# Patient Record
Sex: FEMALE
[De-identification: names, ages, dates, MRNs, and addresses within clinical notes are randomized per-mention and may not be internally consistent; named-entity substitution may affect disease eponyms.]

---

## 2018-08-31 ENCOUNTER — HOSPITAL ENCOUNTER (OUTPATIENT)
Dept: HOSPITAL 92 - BICULT | Age: 48
Discharge: HOME | End: 2018-08-31
Attending: FAMILY MEDICINE
Payer: COMMERCIAL

## 2018-08-31 DIAGNOSIS — K80.20: Primary | ICD-10-CM

## 2018-08-31 PROCEDURE — 76705 ECHO EXAM OF ABDOMEN: CPT

## 2018-10-02 ENCOUNTER — HOSPITAL ENCOUNTER (OUTPATIENT)
Dept: HOSPITAL 92 - SCSCT | Age: 48
Discharge: HOME | End: 2018-10-02
Attending: INTERNAL MEDICINE
Payer: COMMERCIAL

## 2018-10-02 DIAGNOSIS — R07.9: Primary | ICD-10-CM

## 2018-10-02 DIAGNOSIS — R07.81: ICD-10-CM

## 2018-10-02 DIAGNOSIS — K59.00: ICD-10-CM

## 2018-10-02 PROCEDURE — 74160 CT ABDOMEN W/CONTRAST: CPT

## 2018-10-02 NOTE — CT
CT ABDOMEN AND PELVIS WITH IV AND ORAL CONTRAST:

 

HISTORY: 

Postprandial abdominal pain.

 

FINDINGS: 

Lung bases are clear.  The liver, spleen, kidneys, adrenal glands, and pancreas have a normal CT appe
arance.  Nonenlarged, nonspecific lymph nodes are scattered about the mesentery and retroperitoneum. 
 Urinary bladder is incompletely distended.  Appendix not well delineated.  No inflammation.  A large
 amount of stool throughout the colon.

 

IMPRESSION: 

Constipation.  No acute abnormalities are demonstrated.

 

POS: [facility]

## 2020-09-14 ENCOUNTER — HOSPITAL ENCOUNTER (OUTPATIENT)
Dept: HOSPITAL 92 - CTENTCT | Age: 50
Discharge: HOME | End: 2020-09-14
Attending: OTOLARYNGOLOGY
Payer: COMMERCIAL

## 2020-09-14 DIAGNOSIS — J32.9: Primary | ICD-10-CM

## 2020-09-14 PROCEDURE — 70486 CT MAXILLOFACIAL W/O DYE: CPT

## 2025-02-21 ENCOUNTER — HOSPITAL ENCOUNTER (OUTPATIENT)
Dept: HOSPITAL 18 - NAV CT | Age: 55
Discharge: HOME | End: 2025-02-21
Payer: COMMERCIAL

## 2025-02-21 DIAGNOSIS — Z82.3: ICD-10-CM

## 2025-02-21 DIAGNOSIS — G44.89: Primary | ICD-10-CM

## 2025-02-21 DIAGNOSIS — J32.8: ICD-10-CM

## 2025-02-21 PROCEDURE — 70470 CT HEAD/BRAIN W/O & W/DYE: CPT
